# Patient Record
Sex: MALE | Race: WHITE
[De-identification: names, ages, dates, MRNs, and addresses within clinical notes are randomized per-mention and may not be internally consistent; named-entity substitution may affect disease eponyms.]

---

## 2022-12-08 ENCOUNTER — HOSPITAL ENCOUNTER (OUTPATIENT)
Dept: HOSPITAL 47 - RADPROMAIN | Age: 27
Discharge: HOME | End: 2022-12-08
Attending: SURGERY
Payer: COMMERCIAL

## 2022-12-08 VITALS — SYSTOLIC BLOOD PRESSURE: 137 MMHG | HEART RATE: 74 BPM | DIASTOLIC BLOOD PRESSURE: 78 MMHG

## 2022-12-08 VITALS — RESPIRATION RATE: 14 BRPM | TEMPERATURE: 98.3 F

## 2022-12-08 DIAGNOSIS — E07.89: Primary | ICD-10-CM

## 2022-12-08 PROCEDURE — 10005 FNA BX W/US GDN 1ST LES: CPT

## 2022-12-08 PROCEDURE — 88305 TISSUE EXAM BY PATHOLOGIST: CPT

## 2022-12-08 PROCEDURE — 88173 CYTOPATH EVAL FNA REPORT: CPT

## 2022-12-08 NOTE — US
ULTRASOUND GUIDED FNA THYROID BIOPSY:

 

CLINICAL HISTORY: Left thyroid nodule

 

FINDINGS: 

The procedure was explained to the patient.  The risks, complications, benefits and alternatives were
 discussed and any questions were answered.  Informed consent was obtained.  Patient was placed supin
e on the ultrasound table and prepped and draped in the usual sterile fashion.  Utilizing a 25 gauge 
needle, five passes were made into the left thyroid nodule.  

 

Patient was stable throughout the procedure.  Pathology is pending.

 

All elements of maximal barrier technique were utilized.

 

IMPRESSION: 

1.  Successful ultrasound guided FNA thyroid biopsy.